# Patient Record
Sex: MALE | Race: WHITE
[De-identification: names, ages, dates, MRNs, and addresses within clinical notes are randomized per-mention and may not be internally consistent; named-entity substitution may affect disease eponyms.]

---
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
## 2019-03-18 NOTE — RADIOLOGY REPORT (SQ)
EXAM DESCRIPTION:  PET CT SKULL/THIGH



COMPLETED DATE/TIME:  3/17/2019 9:00 pm



REASON FOR STUDY:  RENAL CANCER C64.1  MALIGNANT NEOPLASM OF RIGHT KIDNEY, EXCEPT RENAL PELVI



COMPARISON:  None.



RADIONUCLIDE AND DOSE:  11.3 mCi F18 FDG

The route of agent administration: Intravenous



FASTING BLOOD SUGAR:  83 mg/dl



CONTRAST TYPE AND DOSE:  No CT contrast given.



TECHNIQUE:  Blood glucose level was verified.  Above dose of FDG was injected intravenously.  2-D seg
mented attenuation correction images were obtained from the base of the skull to the midthighs.  Nonc
ontrast CT images were obtained for attenuation correction and fusion with emission images.  CT image
s were performed without oral or intravenous contrast and are not sensitive for parenchymal lesions. 
 A series of overlapping emission PET images were obtained.  [facility] by the radiologist.  Images stored on PACS.



LIMITATIONS:  None.



FINDINGS:  HEAD AND NECK: No areas of abnormal metabolic activity in the soft tissues of the head and
 neck.

CHEST: No areas of abnormal metabolic activity in the chest.

ABDOMEN AND PELVIS: No areas of abnormal metabolic activity in the abdomen or pelvis.  Expected physi
ologic activity is present in the genitourinary system and bowel.

PROXIMAL LOWER EXTREMITIES: No areas of abnormal metabolic activity in the soft tissues of the lower 
extremities.

BONES: No abnormal metabolic activity in the visualized skeleton.

ADDITIONAL CT FINDINGS: Wedge resection along the right major fissure.  Trace right pleural effusion.
  Small pericardial effusion.  Right nephrectomy.

OTHER: No other significant findings.



IMPRESSION:  No evidence of metastatic disease.



TECHNICAL DOCUMENTATION:  JOB ID:  [id number]

 2011 [employer]- All Rights Reserved



Reading location - IP/workstation name: [email or web address]

## 2019-07-26 NOTE — RADIOLOGY REPORT (SQ)
EXAM DESCRIPTION:  CT ABD/PELVIS NO ORAL OR IV



COMPLETED DATE/TIME:  7/26/2019 7:37 am



REASON FOR STUDY:  KIDNEY CA (C64.1) C64.1  MALIGNANT NEOPLASM OF RIGHT KIDNEY, EXCEPT RENAL PELVI



COMPARISON:  4/29/2019.



TECHNIQUE:  CT scan of the chest performed without intravenous contrast using helical scanning techni
que.  Images reviewed with lung, soft tissue and bone windows. Reconstructed coronal and sagittal MPR
 images reviewed.  All images stored on PACS.

All CT scanners at this facility use dose modulation, iterative reconstruction, and/or weight based d
osing when appropriate to reduce radiation dose to as low as reasonably achievable (ALARA).

[facility]: Dose Right  [facility]: CareDose    [facility]: Dose Right    [facility]: Teradose 4D    [facility]: Smart Technologies



RADIATION DOSE:  CT Rad equipment meets quality standard of care and radiation dose reduction techniq
ues were employed. CTDIvol: 9.4 - 12.6 mGy. DLP: 1068 mGy-cm. mGy.



LIMITATIONS:  None.



FINDINGS:  AXILLAE: No adenopathy.

CHEST WALL: No masses.  No subcutaneous air.

LUNGS: There is again evidence of chronic scarring in the right upper lobe (series image number 11/ 6
9 series 2 and 17/69 series 2).  Post radiation change noted in right perihilar region extending into
 right upper lobe, right lower lobe with traction bronchiectasis.  Fiducial markers are in place in t
his region.  Chronic right pleural thickening seen.  (Image number 28 - 41/69 series 2).  Chronic sca
rring lingula.

PLEURA: No effusions.  No calcifications.

THYROID: Normal.  Small precarinal nodes unchanged.

HILAR AND MEDIASTINAL STRUCTURES: No abnormality seen.

AORTA AND GREAT VESSELS: No aneurysm.

HEART: Interval increase in size of pericardial effusion.  .  Atherosclerotic coronary artery disease
 is noted.

BONES: Dorsal spondylosis.

OTHER: No other significant finding.



IMPRESSION:  1.  Postradiation changes and traction bronchiectasis right perihilar region and right l
ower lobe.  Chronic scarring right upper lobe and lingula.  2.  Interval increase in pericardial effu
katie.  Atherosclerotic coronary artery disease.



COMPARISON:  8/3/2015



TECHNIQUE:  CT scan of the abdomen and pelvis performed without intravenous contrast and nooral contr
ast using helical scanning technique with dynamic intravenous contrast injection.  Images reviewed wi
th lung, soft tissue and bone windows.  Reconstructed coronal and sagittal MPR images reviewed.  All 
images stored on PACS.

All CT scanners at this facility use dose modulation, iterative reconstruction, and/or weight based d
osing when appropriate to reduce radiation dose to as low as reasonably achievable (ALARA).

[facility]: Dose Right  [facility]: SureCare    [facility]: Dose Right    [facility]: Teradose 4D    [facility]: [employer]



RADIATION DOSE:  CT Rad equipment meets quality standard of care and radiation dose reduction techniq
ues were employed. CTDIvol: 9.4 - 12.6 mGy. DLP: 1068 mGy-cm.mGy.



LIMITATIONS:  None.



FINDINGS:  LIVER: Simple hepatic cyst right lobe of the liver.

SPLEEN: Normal.

PANCREAS:  Normal.

GALLBLADDER: Normal.  .

ADRENAL GLANDS: Normal.

RIGHT KIDNEY AND URETER: Status post right nephrectomy.

LEFT KIDNEY AND URETER: Interval development of mild ectasia left renal pelvis and left ureter.  No r
enal or ureteral calculi identified.  Perinephric inflammatory change noted.  Thickening of left para
renal fascia.  Consideration for acute pyelonephritis should be made.  Cortical cyst lower pole left 
kidney.

AORTA AND VESSELS: No aneurysm.

RETROPERITONEUM: No retroperitoneal adenopathy, hemorrhage or masses.

APPENDIX: Absent.

LARGE AND SMALL BOWEL: Colonic diverticulosis.  There is inflammatory change surrounding the descendi
ng colon with possibility of acute diverticulitis not excluded.  (image number for 35 - 46/96 series 
3).

ABDOMINAL WALL: No hernia or masses.

PERITONEAL CAVITY: No free air.  No free fluid.  No peritoneal implants or masses.

PELVIS: Status post prostatectomy.  Urinary bladder:  No abnormality.

BONES:  Multilevel lumbar spondylosis and degenerative disc disease.  Acquired stenosis at L2-5.



IMPRESSION:  Status post right nephrectomy.  Interval development of mild ectasia of the left upper c
ollecting system and left ureter with perinephric inflammatory change.  Correlation for possible acut
e pyelonephritis could be made.  Findings suggestive of acute diverticulitis of the descending colon.




TECHNICAL DOCUMENTATION:  JOB ID:  [id number]

[id number]

Quality ID # [id number]: Final reports with documentation of one or more dose reduction techniques (e.g., Au
tomated exposure control, adjustment of the mA and/or kV according to patient size, use of iterative 
reconstruction technique)

 2011 [employer]- All Rights Reserved



Reading location - IP/workstation name: [email or web address]

## 2019-07-26 NOTE — RADIOLOGY REPORT (SQ)
EXAM DESCRIPTION:  CT CHEST WITHOUT



COMPLETED DATE/TIME:  7/26/2019 7:41 am



REASON FOR STUDY:  KIDNEY CA (C64.1) C64.1  MALIGNANT NEOPLASM OF RIGHT KIDNEY, EXCEPT RENAL PELVI



COMPARISON:  4/29/2019, prior PET-CT dated 3/17/2019



TECHNIQUE:  CT scan performed of the chest without intravenous contrast.  Images reviewed with lung, 
soft tissue and bone windows.  Reconstructed coronal and sagittal MPR images reviewed.  All images st
ored on PACS.

All CT scanners at this facility use dose modulation, iterative reconstruction, and/or weight based d
osing when appropriate to reduce radiation dose to as low as reasonably achievable (ALARA).

[facility]: Dose Right  [facility]: CareDose    [facility]: Dose Right    [facility]: Teradose 4D    [facility]: Smart Technologies



RADIATION DOSE:   mGy.



LIMITATIONS:  No technical limitations.



FINDINGS:  LUNGS AND PLEURA: There is a 12.3 mm right apical lesion.  Best demonstrated on lung windo
ws image 22 series 6.  This is increased in size from April.  At that time it measures 7.6 mm.  There
 is a 5.3 mm nodule best demonstrated on series 6, image 34 which is stable in appearance.  There is 
increasing right perihilar airspace disease which may be post radiation.  Slight pleural thickening p
osteriorly no effusions.  No other significant pulmonary nodules.

HILAR AND MEDIASTINAL STRUCTURES: Stable in appearance.  Largest lymph node is pretracheal in locatio
n and measures 14.1 mm.

HEART AND VASCULAR STRUCTURES: There is a pericardial effusion.  Largest diameter is 13.5 mm.  This i
s slightly increased from prior study.

UPPER ABDOMEN: Simple hepatic cyst.

THYROID AND OTHER SOFT TISSUES: No masses.  No adenopathy.

BONES: No significant finding.

HARDWARE: None in the chest.

OTHER: No other significant findings.



IMPRESSION:  1.  12.3 mm right apical lesion.  This is increased in size since prior chest CT done in
 April of this year.  Metastatic disease cannot be excluded.

2.  Increasing right perihilar airspace disease most likely post radiation change.

3.  Stable approximately 5 mm nodule in the periphery of the right upper lobe.



TECHNICAL DOCUMENTATION:  JOB ID:  [id number]

Quality ID # 436: Final reports with documentation of one or more dose reduction techniques (e.g., Au
tomated exposure control, adjustment of the mA and/or kV according to patient size, use of iterative 
reconstruction technique)

 2011 [employer]- All Rights Reserved



Reading location - IP/workstation name: [email or web address]

## 2019-08-19 NOTE — RADIOLOGY REPORT (SQ)
EXAM DESCRIPTION:  PET CT SKULL/THIGH



COMPLETED DATE/TIME:  8/18/2019 9:48 pm



REASON FOR STUDY:  (C64.1)MALIGNANT NEOPLASM OF RIGHT KIDNEY, EXCEPT RENAL PELVIS C64.1  MALIGNANT NE
OPLASM OF RIGHT KIDNEY, EXCEPT RENAL PELVI



COMPARISON:  3/17/2019



RADIONUCLIDE AND DOSE:  12.0 mCi F18 FDG

The route of agent administration: Intravenous



FASTING BLOOD SUGAR:  79 mg/dl



CONTRAST TYPE AND DOSE:  No CT contrast given.



TECHNIQUE:  Blood glucose level was verified.  Above dose of FDG was injected intravenously.  2-D seg
mented attenuation correction images were obtained from the base of the skull to the midthighs.  Nonc
ontrast CT images were obtained for attenuation correction and fusion with emission images.  CT image
s were performed without oral or intravenous contrast and are not sensitive for parenchymal lesions. 
 A series of overlapping emission PET images were obtained.  Images reviewed and manipulated at [facility] work station by the radiologist.  Images stored on PACS.



LIMITATIONS:  None.



FINDINGS:  HEAD AND NECK: No areas of abnormal metabolic activity in the soft tissues of the head and
 neck.

CHEST: 12 mm right apical nodule is not hypermetabolic.  Focal airspace opacity right hilum measuring
 about 2.0 cm in maximum diameter image 89.  2.9 SUV.  This is nonspecific and could be related to po
stradiation inflammation.

ABDOMEN AND PELVIS: No areas of abnormal metabolic activity in the abdomen or pelvis.  Expected physi
ologic activity is present in the genitourinary system and bowel.

PROXIMAL LOWER EXTREMITIES: No areas of abnormal metabolic activity in the soft tissues of the lower 
extremities.

BONES: No abnormal metabolic activity in the visualized skeleton.

ADDITIONAL CT FINDINGS: Recently described pulmonary nodules unchanged morphologically.

OTHER: Blood pool 1.7 SUV liver background 2.5 SUV.



IMPRESSION:

1. Right apical nodule is not hypermetabolic.

2. Mildly hypermetabolic airspace disease in the right hilum, nonspecific.



TECHNICAL DOCUMENTATION:  JOB ID:  [id number]

 [employer]- All Rights Reserved



Reading location - IP/workstation name: [email or web address]

## 2020-03-04 NOTE — RADIOLOGY REPORT (SQ)
EXAM DESCRIPTION:  PET CT SKULL/THIGH



COMPLETED DATE/TIME:  3/3/2020 11:00 am



REASON FOR STUDY:  MALIGNANT NEOPLASM OF RIGHT KIDNEY (C64.1) C64.1  MALIGNANT NEOPLASM OF RIGHT KIDN
EY, EXCEPT RENAL PELVI



COMPARISON:  PET from 8/18/2019.



RADIONUCLIDE AND DOSE:  10.4 mCi F18 FDG

The route of agent administration: Intravenous



FASTING BLOOD SUGAR:  79 mg/dl



CONTRAST TYPE AND DOSE:  No CT contrast given.



TECHNIQUE:  Blood glucose level was verified.  Above dose of FDG was injected intravenously.  2-D seg
mented attenuation correction images were obtained from the base of the skull to the midthighs.  Nonc
ontrast CT images were obtained for attenuation correction and fusion with emission images.  CT image
s were performed without oral or intravenous contrast and are not sensitive for parenchymal lesions. 
 A series of overlapping emission PET images were obtained.  Images reviewed and manipulated at [facility] work station by the radiologist.  Images stored on PACS.



LIMITATIONS:  None.



FINDINGS:  HEAD AND NECK: No areas of abnormal metabolic activity in the soft tissues of the head and
 neck.

CHEST: The 11 mm and 5 mm nodules in the apical segment of the right upper lobe (images 60 and 67) de
monstrate no abnormal FDG uptake and are unchanged in size from 8/18/2019.

The solid 7 mm nodule in the apical segment of the right upper lobe (image 69 of series 3) and the 16
 x 10 mm ground-glass nodular opacity in the apical segment of the left upper lobe (image 67 of serie
s 3) demonstrate no abnormal FDG uptake but have increased in size from the prior PET.

There is a new ill-defined nodular and peribronchial opacity in the right upper lobe (image 76 of ser
ies 3) that demonstrates mild FDG uptake (maximum SUV of 2, equal to that of the mediastinal blood po
ol) ; the other peribronchial opacities in the right upper that are associated with mild bronchiectas
is (images 83 of series 3) also demonstrate mild FDG uptake (maximum SUV of 2.3) and are unchanged.

The peribronchial opacity in the right lower lobe that is associated with mild bronchiectasis and con
tains fiducial markers has contracted and demonstrates mild FDG uptake with a maximum SUV of 2.3.

The branching nodular opacities in the lingula (image 109 of series 3) are new and demonstrate minima
l FDG uptake (the maximum SUV is less than 2).

The right lower paratracheal lymph node on image 80 of series 3 has increased in size and it measures
 12 mm in short axis diameter compared to 10 mm on the prior CT ; the lymph node demonstrates no abno
rmal FDG uptake on PET.  There is no hypermetabolic thoracic adenopathy.

There is a trace amount of fluid within the pericardial and right pleural spaces.

ABDOMEN AND PELVIS: The liver demonstrates heterogeneous non focal FDG uptake with an average SUV of 
2.2.  There is expected physiologic activity throughout the gastrointestinal and genitourinary tracts


PROXIMAL LOWER EXTREMITIES: No areas of abnormal metabolic activity in the soft tissues of the lower 
extremities.

BONES: No areas of abnormal metabolic activity in the imaged axial and appendicular skeleton.

ADDITIONAL CT FINDINGS: There is moderate atherosclerotic calcification of the coronary arteries.  Th
e 1.9 x 1.4 cm hypodense lesion in the hepatic dome (image 108 of series 3) is unchanged.  The spleen
 is normal in size.  There is no adrenal mass.  The right kidney is surgically absent.  There is no h
ydronephrosis, nephrolithiasis, hydroureter or ureterolithiasis on the contralateral side.  The prost
ate gland is also surgically absent.  The urinary bladder is partially distended.  There is colonic d
iverticulosis without diverticulitis.  There is a laminectomy defect at L4.

OTHER: No other findings.



IMPRESSION:  1. Enlarging solid 7 mm nodule in the apical segment of the right upper lobe (image 69 o
f series 3) and 16 x 10 mm ground-glass nodular opacity in the apical segment of the left upper lobe 
(image 67 of series 3) that demonstrate no abnormal FDG uptake.  There is also a developing ill-defin
ed nodular and peribronchial nodular opacity in the right upper lobe (image 76 of series 3) that demo
nstrates mild FDG uptake.  A follow-up CT in 3 months is recommended.

2.  Peribronchial opacities in the right upper and right lower lobe that are associated with mild bro
nchiectasis and demonstrate mild FDG uptake could represent chronic radiation pneumonitis.

3.  New branching nodular opacities in the lingula (image 109 of series 3) that demonstrate minimal F
DG uptake (the maximum SUVs less than 2) could represent atelectasis or an infectious/inflammatory pr
ocess.  Attention on the follow-up CT is recommended.



TECHNICAL DOCUMENTATION:  JOB ID:  [id number]

 2011 [employer]- All Rights Reserved



Reading location - IP/workstation name: [email or web address]

## 2020-09-17 ENCOUNTER — HOSPITAL ENCOUNTER (OUTPATIENT)
Dept: HOSPITAL 62 - RAD | Age: 75
End: 2020-09-17
Attending: NURSE PRACTITIONER
Payer: MEDICARE

## 2020-09-17 DIAGNOSIS — C64.1: Primary | ICD-10-CM

## 2020-09-17 DIAGNOSIS — R91.8: ICD-10-CM

## 2020-09-17 DIAGNOSIS — K57.30: ICD-10-CM

## 2020-09-17 PROCEDURE — 74176 CT ABD & PELVIS W/O CONTRAST: CPT

## 2020-09-17 PROCEDURE — 71250 CT THORAX DX C-: CPT

## 2020-09-17 NOTE — RADIOLOGY REPORT (SQ)
EXAM DESCRIPTION:  CT CHEST WITHOUT



IMAGES COMPLETED DATE/TIME:  9/17/2020 8:27 am



REASON FOR STUDY:  KIDNEY CA C64.1  MALIGNANT NEOPLASM OF RIGHT KIDNEY, EXCEPT RENAL PELVI



COMPARISON:  6/8/2020.



TECHNIQUE:  CT scan performed of the chest without intravenous contrast.  Images reviewed with lung, 
soft tissue and bone windows.  Reconstructed coronal and sagittal MPR images reviewed.  All images st
ored on PACS.

All CT scanners at this facility use dose modulation, iterative reconstruction, and/or weight based d
osing when appropriate to reduce radiation dose to as low as reasonably achievable (ALARA).

[facility]: Dose Right  [facility]: CareDose    [facility]: Dose Right    [facility]: Teradose 4D    [facility]: Smart Technologies



RADIATION DOSE:  CT Rad equipment meets quality standard of care and radiation dose reduction techniq
ues were employed. CTDIvol: 10.8 - 12.4 mGy. DLP: 1114 mGy-cm. mGy.



LIMITATIONS:  No technical limitations.



FINDINGS:  LUNGS AND PLEURA: Several new pleural-based nodules and a new right apical mass measuring 
4.4 x 5.6 cm.  Enlarging rind of tissue in the right hilum adjacent to fiduciary markers measuring 4.
0 x 2.2 cm.  New 7 mm nodule in the lingula.  Stable 10 mm spiculated right apical nodule.

HILAR AND MEDIASTINAL STRUCTURES: New 2.4 x 1.1 cm subcarinal node.  New 2 cm right cardiophrenic nod
e.

HEART AND VASCULAR STRUCTURES: Small pericardial effusion.

UPPER ABDOMEN: See separate report of the CT of the abdomen.

THYROID AND OTHER SOFT TISSUES: No masses.  No adenopathy.

BONES: No significant finding.

HARDWARE: Right-sided port tip in the SVC.

OTHER: No other significant findings.



IMPRESSION:  New right apical mass.  Progressive pulmonary nodules



TECHNICAL DOCUMENTATION:  JOB ID:  [id number]

Quality ID # 436: Final reports with documentation of one or more dose reduction techniques (e.g., Au
tomated exposure control, adjustment of the mA and/or kV according to patient size, use of iterative 
reconstruction technique)

 2011 [employer]- All Rights Reserved



Reading location - IP/workstation name: [email or web address]

## 2020-09-17 NOTE — RADIOLOGY REPORT (SQ)
EXAM DESCRIPTION:  CT ABD/PELVIS NO ORAL OR IV



IMAGES COMPLETED DATE/TIME:  9/17/2020 8:27 am



REASON FOR STUDY:  KIDNEY CA C64.1  MALIGNANT NEOPLASM OF RIGHT KIDNEY, EXCEPT RENAL PELVI



COMPARISON:  CT of the abdomen pelvis without contrast from 7/26/2019 and PET from 3/3/2020.



TECHNIQUE:  CT scan of the abdomen and pelvis performed without intravenous or oral contrast. Images 
reviewed with lung, soft tissue, and bone windows. Reconstructed coronal and sagittal MPR images revi
ewed. All images stored on PACS.

All CT scanners at this facility use dose modulation, iterative reconstruction, and/or weight based d
osing when appropriate to reduce radiation dose to as low as reasonably achievable (ALARA).

[facility]: Dose Right  [facility]: CareDose    [facility]: Dose Right    [facility]: Teradose 4D    [facility]: Smart [employer]



LIMITATIONS:  None.



FINDINGS:  LOWER CHEST:  Refer to the separate report of the CT of the chest.

NON-CONTRASTED LIVER, SPLEEN, ADRENALS: Evaluation is limited due to the absence of intravenous contr
ast.  The 19 x 16 mm hypodense lesion in hepatic dome (image 10 of series 3) is unchanged.  There is 
no evidence hepatic steatosis.  The spleen is normal in size.  The 7 mm right adrenal nodule (image 2
3 of series 3) is stable.

PANCREAS: No acute gross abnormality of the pancreas.

GALLBLADDER: No abnormality that is apparent on CT.

RIGHT KIDNEY AND URETER: Surgically absent.

LEFT KIDNEY AND URETER: Evaluation is limited due to the absence of intravenous contrast.  There is n
o hydronephrosis, nephrolithiasis, hydroureter or ureterolithiasis.

AORTA AND RETROPERITONEUM: No aneurysm of the abdominal aorta.  No retroperitoneal adenopathy, hemorr
emily or mass.

BOWEL AND PERITONEAL CAVITY: Colonic diverticulosis without diverticulitis.  There is no bowel obstru
ction, bowel wall thickening or pericolonic/ perienteric inflammation.  There is no mesenteric adenop
athy, free intraperitoneal fluid or mesenteric/omental inflammation.

APPENDIX: Unable to identify the appendix.

PELVIS, BLADDER, AND ABDOMINAL WALL:The prostate gland is surgically absent.  The wall of the urinary
 bladder is circumferentially thickened (unchanged).  There is they have ventral laparotomy incision 
scar in the suprapubic region.  There is no pelvic mass.

BONES: There is degenerative spondylosis and facet arthropathy of the lumbar spine with a laminectomy
 defect at L4.

OTHER: No other finding.



IMPRESSION:  1.  No acute intraabdominal abnormality.

2.  Stable 19 x 60 mm hypodense lesion in the hepatic dome (image 10 of series 3).

3.  Stable 7 mm right adrenal nodule (image 23 of series 3).

4.  Colonic diverticulosis without diverticulitis.

5. 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

6.  Chronic circumferential thickening of the you would urinary bladder wall.



COMMENT:  Quality ID # 436: Final reports with documentation of one or more dose reduction techniques
 (e.g., Automated exposure control, adjustment of the mA and/or kV according to patient size, use of 
iterative reconstruction technique)



TECHNICAL DOCUMENTATION:  JOB ID:  [id number]

 2011 [employer]- All Rights Reserved



Reading location - IP/workstation name: [email or web address]

## 2020-11-16 ENCOUNTER — HOSPITAL ENCOUNTER (EMERGENCY)
Dept: HOSPITAL 62 - ER | Age: 75
Discharge: HOME | End: 2020-11-16
Payer: MEDICARE

## 2020-11-16 VITALS — DIASTOLIC BLOOD PRESSURE: 88 MMHG | SYSTOLIC BLOOD PRESSURE: 140 MMHG

## 2020-11-16 DIAGNOSIS — E78.00: ICD-10-CM

## 2020-11-16 DIAGNOSIS — R07.81: ICD-10-CM

## 2020-11-16 DIAGNOSIS — I10: ICD-10-CM
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
## 2020-11-16 DIAGNOSIS — S20.312A: ICD-10-CM

## 2020-11-16 DIAGNOSIS — S20.212A: Primary | ICD-10-CM

## 2020-11-16 PROCEDURE — 71101 X-RAY EXAM UNILAT RIBS/CHEST: CPT

## 2020-11-16 PROCEDURE — 99283 EMERGENCY DEPT VISIT LOW MDM: CPT
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
## 2020-11-16 NOTE — RADIOLOGY REPORT (SQ)
EXAM DESCRIPTION:  RIBS LEFT W/PA CHEST



IMAGES COMPLETED DATE/TIME:  11/16/2020 5:33 pm



REASON FOR STUDY:  Fall Friday night severe pain



COMPARISON:  None.



TECHNIQUE:  Frontal view of the chest and additional views of the left ribs acquired.



NUMBER OF VIEWS:  Five views



LIMITATIONS:  None.



FINDINGS:  FRONTAL CXR: There is scarring in the right lung.  Injection port is present on the right.
  There is a 5 cm rounded mass in the right upper lobe.

RIBS: No displaced rib fractures.  No lytic or blastic bony lesions.

OTHER: No other significant finding.



IMPRESSION:  Right upper lobe lung mass.  No rib abnormality is appreciated.



COMMENT:  SITE OF TRAUMA/COMPLAINT MARKED/STAMP COMPLETED: YES.



TECHNICAL DOCUMENTATION:  JOB ID:  [id number]

 2011 [employer]- All Rights Reserved



Reading location - IP/workstation name: [email or web address]
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
## 2020-11-20 ENCOUNTER — HOSPITAL ENCOUNTER (OUTPATIENT)
Dept: HOSPITAL 62 - OD | Age: 75
End: 2020-11-20
Attending: INTERNAL MEDICINE
Payer: MEDICARE

## 2020-11-20 DIAGNOSIS — E83.42: Primary | ICD-10-CM

## 2020-11-20 DIAGNOSIS — N18.30: ICD-10-CM

## 2020-11-20 DIAGNOSIS — D64.9: ICD-10-CM

## 2020-11-20 DIAGNOSIS — C64.1: ICD-10-CM

## 2020-11-20 LAB
ALBUMIN SERPL-MCNC: 4 G/DL (ref 3.5–5)
ALBUMIN SERPL-MCNC: 4 G/DL (ref 3.5–5)
ALP SERPL-CCNC: 109 U/L (ref 38–126)
ANION GAP SERPL CALC-SCNC: 12 MMOL/L (ref 5–19)
ANION GAP SERPL CALC-SCNC: 12 MMOL/L (ref 5–19)
AST SERPL-CCNC: 28 U/L (ref 17–59)
BILIRUB DIRECT SERPL-MCNC: 0.2 MG/DL (ref 0–0.4)
BILIRUB SERPL-MCNC: 0.5 MG/DL (ref 0.2–1.3)
BUN SERPL-MCNC: 39 MG/DL (ref 7–20)
BUN SERPL-MCNC: 39 MG/DL (ref 7–20)
CALCIUM: 9.8 MG/DL (ref 8.4–10.2)
CALCIUM: 9.8 MG/DL (ref 8.4–10.2)
CHLORIDE SERPL-SCNC: 93 MMOL/L (ref 98–107)
CHLORIDE SERPL-SCNC: 93 MMOL/L (ref 98–107)
CO2 SERPL-SCNC: 25 MMOL/L (ref 22–30)
CO2 SERPL-SCNC: 25 MMOL/L (ref 22–30)
FERRITIN SERPL-MCNC: 142 NG/ML (ref 17.9–464)
GLUCOSE SERPL-MCNC: 95 MG/DL (ref 75–110)
GLUCOSE SERPL-MCNC: 95 MG/DL (ref 75–110)
IRON(TIBC): 75.9 UG/DL (ref 49–181)
PHOSPHATE SERPL-MCNC: 3.7 MG/DL (ref 2.5–4.5)
POTASSIUM SERPL-SCNC: 4.8 MMOL/L (ref 3.6–5)
POTASSIUM SERPL-SCNC: 4.8 MMOL/L (ref 3.6–5)
PROT SERPL-MCNC: 6.9 G/DL (ref 6.3–8.2)

## 2020-11-20 PROCEDURE — 80053 COMPREHEN METABOLIC PANEL: CPT

## 2020-11-20 PROCEDURE — 83735 ASSAY OF MAGNESIUM: CPT

## 2020-11-20 PROCEDURE — 83540 ASSAY OF IRON: CPT

## 2020-11-20 PROCEDURE — 36415 COLL VENOUS BLD VENIPUNCTURE: CPT

## 2020-11-20 PROCEDURE — 83550 IRON BINDING TEST: CPT

## 2020-11-20 PROCEDURE — 80069 RENAL FUNCTION PANEL: CPT

## 2020-11-20 PROCEDURE — 82728 ASSAY OF FERRITIN: CPT

## 2020-11-24 ENCOUNTER — HOSPITAL ENCOUNTER (OUTPATIENT)
Dept: HOSPITAL 62 - OD | Age: 75
End: 2020-11-24
Attending: INTERNAL MEDICINE
Payer: MEDICARE

## 2020-11-24 DIAGNOSIS — N18.30: Primary | ICD-10-CM

## 2020-11-24 LAB
ANION GAP SERPL CALC-SCNC: 10 MMOL/L (ref 5–19)
BUN SERPL-MCNC: 35 MG/DL (ref 7–20)
CALCIUM: 9.6 MG/DL (ref 8.4–10.2)
CHLORIDE SERPL-SCNC: 97 MMOL/L (ref 98–107)
CO2 SERPL-SCNC: 27 MMOL/L (ref 22–30)
GLUCOSE SERPL-MCNC: 90 MG/DL (ref 75–110)
POTASSIUM SERPL-SCNC: 4.7 MMOL/L (ref 3.6–5)

## 2020-11-24 PROCEDURE — 36415 COLL VENOUS BLD VENIPUNCTURE: CPT

## 2020-11-24 PROCEDURE — 80048 BASIC METABOLIC PNL TOTAL CA: CPT

## 2020-12-28 ENCOUNTER — HOSPITAL ENCOUNTER (OUTPATIENT)
Dept: HOSPITAL 62 - RAD | Age: 75
End: 2020-12-28
Attending: INTERNAL MEDICINE
Payer: MEDICARE

## 2020-12-28 DIAGNOSIS — C64.1: Primary | ICD-10-CM

## 2020-12-28 DIAGNOSIS — I25.10: ICD-10-CM

## 2020-12-28 PROCEDURE — 71250 CT THORAX DX C-: CPT

## 2020-12-28 PROCEDURE — 74176 CT ABD & PELVIS W/O CONTRAST: CPT

## 2020-12-28 PROCEDURE — 78306 BONE IMAGING WHOLE BODY: CPT

## 2020-12-28 PROCEDURE — A9503 TC99M MEDRONATE: HCPCS

## 2020-12-28 NOTE — RADIOLOGY REPORT (SQ)
EXAM DESCRIPTION:  CT ABD/PELVIS NO ORAL OR IV



IMAGES COMPLETED DATE/TIME:  12/28/2020 9:56 am



REASON FOR STUDY:  (C64.1)MALIGNANT NEOPLASM OF RIGHT KIDNEY, EXCEPT RENAL PELVIS C64.1  MALIGNANT NE
OPLASM OF RIGHT KIDNEY, EXCEPT RENAL PELVI



COMPARISON:  9/17/2020



TECHNIQUE:  CT scan of the abdomen and pelvis performed without intravenous or oral contrast. Images 
reviewed with lung, soft tissue, and bone windows. Reconstructed coronal and sagittal MPR images revi
ewed. All images stored on PACS.

All CT scanners at this facility use dose modulation, iterative reconstruction, and/or weight based d
osing when appropriate to reduce radiation dose to as low as reasonably achievable (ALARA).

[facility]: Dose Right  [facility]: CareDose    [facility]: Dose Right    [facility]: Teradose 4D    [facility]: Smart [employer]



RADIATION DOSE:  CT Rad equipment meets quality standard of care and radiation dose reduction techniq
ues were employed. CTDIvol: 9.3 - 11.2 mGy. DLP: 994 mGy-cm.mGy.



LIMITATIONS:  None.



FINDINGS:  LOWER CHEST: See separate report of the CT of the chest.

NON-CONTRASTED LIVER, SPLEEN, ADRENALS: Evaluation limited by lack of IV contrast.   Stable 19 mm hep
atic dome cyst.  No new identified significant masses.  Stable 7 mm nodular right adrenal gland.  Unr
emarkable left adrenal gland.

PANCREAS: No masses. No peripancreatic inflammatory changes.

GALLBLADDER: No identified stones by CT criteria. No inflammatory changes to suggest cholecystitis.

RIGHT KIDNEY AND URETER: Surgically absent.

LEFT KIDNEY AND URETER: No suspicious masses. Assessment limited by lack of IV contrast.   No signifi
cant calcifications.   No hydronephrosis or hydroureter.

AORTA AND RETROPERITONEUM: No aneurysm. No retroperitoneal masses or adenopathy.

BOWEL AND PERITONEAL CAVITY: No evidence of intestinal obstruction.  No focal bowel wall thickening. 
 Multiple scattered colonic diverticula.  Moderate stool throughout the colon.

APPENDIX: Normal.

PELVIS, BLADDER, AND ABDOMINAL WALL:No abnormal masses. No free fluid. Bladder normal.  Prostate is s
urgically absent.

BONES: There is a new destructive soft tissue mass within the left sacral ala measuring approximately
 5.6 x 3.8 cm (series 3, image 62).  This lesion extends to the S1 and S2 sacral neural foramina and 
abuts the sacroiliac joint.  Increased size of a lucent lesion within the L3 vertebral body measuring
 25 mm, previously 20 mm (series 3, image 38) questionable additional lucent lesion along the L1 infe
rior endplate (series 3, image 30).  Stable well corticated lesion along the L1 superior endplate, li
sienna Schmorl's node.  Multilevel spondylosis and lower lumbar facet arthropathy.

OTHER: No other significant finding.



IMPRESSION:  1.  Status post right nephrectomy.

2.  New osseous lesions highly suspicious for metastatic disease.  Largest is within the left sacral 
ala measuring 5.6 x 3.8 cm.  No evidence of intra-abdominal/pelvic metastatic disease.

3.  Stable additional incidental findings as above.

4.  See same-day chest CT for findings above the diaphragm.

Findings were reported to Dr. Spencer at the time of interpretation.



COMMENT:  Quality ID # 436: Final reports with documentation of one or more dose reduction techniques
 (e.g., Automated exposure control, adjustment of the mA and/or kV according to patient size, use of 
iterative reconstruction technique)



TECHNICAL DOCUMENTATION:  JOB ID:  [id number]

 2011 [employer]- All Rights Reserved



Reading location - IP/workstation name: [email or web address]

## 2020-12-28 NOTE — RADIOLOGY REPORT (SQ)
EXAM DESCRIPTION:  NM WHOLE BODY BONE SCAN



IMAGES COMPLETED DATE/TIME:  12/28/2020 1:46 pm



REASON FOR STUDY:  (C64.1)MALIGNANT NEOPLASM OF RIGHT KIDNEY, EXCEPT RENAL PELVIS C64.1  MALIGNANT NE
OPLASM OF RIGHT KIDNEY, EXCEPT RENAL PELVI



COMPARISON:  Various imaging studies.



RADIONUCLIDE AND DOSE:  20 millicuries Tc99m MDP.

The route of agent administration: Intravenous.



ADDITIONAL DRUGS AND DOSES:  None.



TECHNIQUE:  Routine delayed images at 3 hours post radionuclide injection acquired of the bony skelet
on including anterior and posterior whole-body projections and additional focused images as needed.



LIMITATIONS:  None.



FINDINGS:  BONES: There is ill-defined opacification and what appear to be 2 contiguous ribs on the l
eft laterally.  There is focal degenerative uptake in the left 1st metatarsal-phalangeal joint.  Ther
e is increased uptake in the right sternoclavicular joint.  Mild uptake in the region of the sphenoid
 sinuses.

KIDNEYS: Symmetric excretion without obstruction.

OTHER: No other significant finding.



IMPRESSION:  There is rib uptake on the left which is secondary to old rib fractures.  There are area
s of degenerative uptake.  The overall appearance of the scan does not suggest metastatic disease to 
bone, but metastases cannot entirely be excluded.



COMMENT:  Quality measure 147:  Current bone scan is compared with any available plain radiographs, p
rior bone scans, and CT/MRI.



TECHNICAL DOCUMENTATION:  JOB ID:  [id number]

 2011 [employer]- All Rights Reserved



Reading location - IP/workstation name: [email or web address]

## 2020-12-28 NOTE — RADIOLOGY REPORT (SQ)
EXAM DESCRIPTION:  CT CHEST WITHOUT



IMAGES COMPLETED DATE/TIME:  12/28/2020 9:56 am



REASON FOR STUDY:  (C64.1)MALIGNANT NEOPLASM OF RIGHT KIDNEY, EXCEPT RENAL PELVIS C64.1  MALIGNANT NE
OPLASM OF RIGHT KIDNEY, EXCEPT RENAL PELVI



COMPARISON:  9/17/2020 CT chest



TECHNIQUE:  CT scan performed of the chest without intravenous contrast.  Images reviewed with lung, 
soft tissue and bone windows.  Reconstructed coronal and sagittal MPR images reviewed.  All images st
ored on PACS.

All CT scanners at this facility use dose modulation, iterative reconstruction, and/or weight based d
osing when appropriate to reduce radiation dose to as low as reasonably achievable (ALARA).

[facility]: Dose Right  [facility]: CareDose    [facility]: Dose Right    [facility]: Teradose 4D    [facility]: [employer]



RADIATION DOSE:   mGy.



LIMITATIONS:  No technical limitations.



FINDINGS:  LUNGS AND PLEURA: Stable spiculated 10.5 mm right apical pulmonary nodule (series 6, image
 21).  Decreased size of the lingula nodule measuring 5.6 mm, previously 7 mm (series 6, image 91).  
Stable linear consolidation and scarring around the right hilum and lower lobe.  Stable additional bi
basilar scarring.  No new discrete parenchymal nodules.  There are multiple pleural-based nodules and
 masses.  For reference largest right apical mass measures 4 point 9 x 4.1 cm, previously 5.6 x 4.4 c
m (series 2, image 16).  Posterior right-sided pleural based nodule demonstrates minimal decrease siz
e measuring 16.6 mm maximally, previously 17.5 mm (series 2, image 29).  Previously-seen soft tissue 
conglomeration around the right hilum has decreased in size and conspicuity without discernible measu
re pole lesion.  Small right-sided pleural effusion again noted.  No new airspace disease.  No pneumo
thorax.

HILAR AND MEDIASTINAL STRUCTURES: Decreased size of the subcarinal node measuring 8.6 mm in short axi
s, previously 11.4 mm (series 2, image 31).  Additional right-sided cardiophrenic nodule has also dec
reased in size measuring 15.7 mm, previously 2 cm.  No new discrete mediastinal or hilar adenopathy.

HEART AND VASCULAR STRUCTURES: Normal heart size.  No aneurysm.  Scattered coronary atherosclerosis. 
 He was stable mild pericardial effusion.

UPPER ABDOMEN: See separate report of the CT of the abdomen.

THYROID AND OTHER SOFT TISSUES: No masses.  No adenopathy.

BONES: New subacute appearing left anterior rib fractures involving the 5th through 7th ribs.  No gail
picious lytic or blastic osseous lesions.

HARDWARE: Right IJ based chest port with catheter tip at SVC.

OTHER: No other significant findings.



IMPRESSION:  1.  Stable to 2 decreased size of the previously described pulmonary and pleural based n
odules and subcarinal adenopathy compared to prior exam suggestive of favorable response to therapy. 
 No new discrete nodules or masses.

2.  Scattered coronary atherosclerosis.  Stable mild pericardial effusion.

3.  Subacute appearing left anterior rib fractures involving the 5th through 7th ribs.  These are new
 compared to prior exam.



TECHNICAL DOCUMENTATION:  JOB ID:  [id number]

Quality ID # 436: Final reports with documentation of one or more dose reduction techniques (e.g., Au
tomated exposure control, adjustment of the mA and/or kV according to patient size, use of iterative 
reconstruction technique)

 2011 [employer]- All Rights Reserved



Reading location - IP/workstation name: [email or web address]